# Patient Record
(demographics unavailable — no encounter records)

---

## 2024-10-28 NOTE — DATA REVIEWED
[FreeTextEntry1] : labs  10/24   mild anemia -DAVID with Fe 34 and onl 7% saturated....but nl B12, UA/ UPCR, heavy metal, Folate, CK, C3/ 4ESR, CRP, SPEP,  with recent comprehensive rheum studies still negative QFT, HLAB27, QFT, ANCA/ PR3/ MPO, RF/ CCP, Hep B- Core/C, ACE immunofixation  11/2022 nl ESR, CRP, neg ANCA, RF, ISAAC, QuantiFERON     Imaging:  CTScan 3/23 improved RUL nodule now 0.7 cm.  Small HH and s/p sleeve gastrectomy

## 2024-10-28 NOTE — PHYSICAL EXAM
[General Appearance - Alert] : alert [General Appearance - In No Acute Distress] : in no acute distress [Sclera] : the sclera and conjunctiva were normal [PERRL With Normal Accommodation] : pupils were equal in size, round, and reactive to light [Extraocular Movements] : extraocular movements were intact [Outer Ear] : the ears and nose were normal in appearance [Nasal Cavity] : the nasal mucosa and septum were normal [Oropharynx] : the oropharynx was normal [Neck Appearance] : the appearance of the neck was normal [Neck Cervical Mass (___cm)] : no neck mass was observed [Jugular Venous Distention Increased] : there was no jugular-venous distention [Thyroid Diffuse Enlargement] : the thyroid was not enlarged [Thyroid Nodule] : there were no palpable thyroid nodules [Respiration, Rhythm And Depth] : normal respiratory rhythm and effort [Exaggerated Use Of Accessory Muscles For Inspiration] : no accessory muscle use [Auscultation Breath Sounds / Voice Sounds] : lungs were clear to auscultation bilaterally [Heart Rate And Rhythm] : heart rate was normal and rhythm regular [Heart Sounds] : normal S1 and S2 [Heart Sounds Gallop] : no gallops [Murmurs] : no murmurs [Heart Sounds Pericardial Friction Rub] : no pericardial rub [Full Pulse] : the pedal pulses are present [Edema] : there was no peripheral edema [Bowel Sounds] : normal bowel sounds [Abdomen Soft] : soft [Abdomen Tenderness] : non-tender [Abdomen Mass (___ Cm)] : no abdominal mass palpated [Cervical Lymph Nodes Enlarged Posterior Bilaterally] : posterior cervical [Supraclavicular Lymph Nodes Enlarged Bilaterally] : supraclavicular [Cervical Lymph Nodes Enlarged Anterior Bilaterally] : anterior cervical [No CVA Tenderness] : no ~M costovertebral angle tenderness [No Spinal Tenderness] : no spinal tenderness [Skin Color & Pigmentation] : normal skin color and pigmentation [Skin Turgor] : normal skin turgor [] : no rash [Sensation] : the sensory exam was normal to light touch and pinprick [No Focal Deficits] : no focal deficits [Oriented To Time, Place, And Person] : oriented to person, place, and time [Impaired Insight] : insight and judgment were intact [Affect] : the affect was normal [Mood] : the mood was normal [Abnormal Walk] : normal gait [Nail Clubbing] : no clubbing  or cyanosis of the fingernails [Musculoskeletal - Swelling] : no joint swelling seen [Motor Tone] : muscle strength and tone were normal [FreeTextEntry1] : no overt synovitis- fROM throughout with mild diffuse tender points worse in lower extr

## 2024-10-28 NOTE — ASSESSMENT
[FreeTextEntry1] : 38 y/o f w/a PMHx of anxiety, depression, prediabetes, psoriasis, polyarthralgia, chronic GERD w/ HH, overweight (s/p gastric sleeve), and lumbar herniated disc with radiculopathy She is presenting in office for joint pain/weakness consultation.  Has completed full neuro w/u with little to explain widespread pain.  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 ex smoker   1) Diffuse polyarthralgias:  joint pain is primarily symmetrical in her hands, feet and hips and worse when she tries to use them and is limited by her pain ... hx CTS + NCS in past, now negative and continues w/ intermittent c/o paresthesias, weakness No previous or current overt swelling  but stiffness though < 20 mins   Routine use of 600-800 mg Advil with some benefit.  Able to care for self and family.. but not exercising "too much pain". Hx signficant for psoriasis x many ys, not active now + FH mother with RA on TNFi + response  Initial serologies 2022 were negative ISAAC/ RF and nl ESR, CRP  Associated with  - Fatigue:  chronically NRS light sleeper, wakes several times at HS rarely more than 5-6 hs / night  - Dry eyes controlled with use of contacts.. no gtts  - PNA last year with hemoptysis and again this year infection but no hemoptysis.. CTScan 3/23 improved RUL nodule now 0.7 cm.  Small HH and s/p sleeve gastrectomy - Mood:  long hx depression/ anxiety recently restarted Prozac 20 mg with + response but c/o routine night sweats on Prozac now and in past   Discussion:  hx suggestive possible early inflammatory arthritis given FH and psoriasis but little on exam today to suggest.  Also high risk for FM though likely meets personal criteria for such, only mild tender points on exam today.  Sleep/ and mood definitely need to be addressed. Discourage use of benzo for sleep.. trial of gabapentin highly recommended given # neuropathic c/o.  Updated serologies now and advised to call immediately if joint swelling presents in either symmetrical/ or asymmetrical pattern. Will also check HLAB27 and consider getting SI imaging if not noted in w/u to date. Need to review neuro w/u- imaging   2) Lung nodule:  RUL thought to be infectious, improved over time.. see above.    3) Overweight:  BMI 28 with hx of gastric sleeve procedure and HH.  Hx low testosterone?? related - DM A1c recently 6.4  - Low testosterone   4) Lumbar radiculopathy and paresthesias across thoracic spine (meralgia paraesthetica):  tolerable at this time  using topical agents...  has not tried gabapentin...    Plan  - Still needs formal exercise regimen-> balanced with caring for family etc..   - needs to come in for eval, unclear how much joint pain is  infflammatory vs. centralized   - eat healthy and limit inflammatory foods, monitor wt....reports she always feels worse when she gains weight   - what happened with gabapentin   - knows to call the office if symptoms worsen or experience a sudden onset of new pain, swelling, and stiffness

## 2024-10-28 NOTE — HISTORY OF PRESENT ILLNESS
[FreeTextEntry1] : Patient at home, has consented to telehealth video visit today  Provider:  Consuelo Humphrey [facility] [id number]  Verbal consent given on 10/28/2024 at 08:02 by KHRIS SUH, ~  ~. TEledoc   Last Seen nearly 1 year ago...  Has been back on Prozac and overall was doing better and felt that many of symptoms were due to stress and r/t mood issues.  Never did go for labs - ?? trial gabapentin?  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  Now back to diffuse complaints as noted last year..  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  Updated labs:10/24   mild anemia -DAVID with Fe 34 and onl 7% saturated....but nl B12, UA/ UPCR, heavy metal, Folate, CK, C3/ 4ESR, CRP, SPEP,  with recent comprehensive rheum studies still negative QFT, HLAB27, QFT, ANCA/ PR3/ MPO, RF/ CCP, Hep B- Core/C, ACE immunofixation  1) Diffuse polyarthralgias 2) Lung Nodule:  RUL 3) Overweight s/p gastric sleeve with DM  4) Lumbar / Thoracic radiculopathy- chronic nostalgia paresthetica...  _______________________________________________________________________ Initial HPI 2023  36 y/o f w/a PMHx of anxiety, depression, prediabetes, abnormal ANCA, psoriasis, polyarthralgia, chronic GERD, overweight, and lumbar herniated disc. She is presenting in office for joint pain/weakness consultation. Reports this is her last stop on her journey of figuring out the cause of her pain. She presented to neuro, spinal surgeon, OB, and PCP and she has been ruled out for any neuro deficit or damage.  - joint pain is primarily in her hands and worse when she tries to use them and is limited by her pain   - c/o weakness in her wrist and has difficulty holding things  - has numbness and stiffness in her hands as well when they are in a certain position.....difficulty driving, writing, etc. - in the past was dx w/ carpal tunnel  - also has some numbness in her feet   - has severe hip joint pain when walking and in feet attributes it to occurring after having her last born  - has a herniated disc in her back and attributes her back pain to this  - for her pain she takes Advil every day from 600-800 mg a day   - always feels fatigue and lethargic when she wakes up....also wakes up w/5-10 mins worth of stiffness in the morning  - sleep is not great, gets about 5-6 hrs of sleep - reports her sleep is very fragmented and she is not sure what is causing her to wake up   - does not exercise much because she is too much pain and has since gained weight   ROS - easy bruising - dry eyes....always had it not sure where it stems from, worse when she has on contacts....when severe she wears her glasses - active cough due to recent illness  - had pneumonia last year and was hospitalized for 4 days and had a lesion on her lung residual from it...reports they tracked the lesion and it has improved over time....last ct scan was over 6 months ago  - back pain reports residual from her herniated discs  - numbness/tingling in her hands and feet - eczema  - psoriasis.....hx on the inside of her elbows, neck and scalp describes it looking like plaque psoriasis, never got a biopsy, but reports that all her episodes resolved after high school - was on Prozac for a while d/c for 9 years and decided to get back on it 2 months ago after her mood started to take a turn for the worse w/ increase in home stressors...feels better since being back on it  - sweats like crazy on Prozac, but besides that she has not negative side effects  SURGICAL HX - gallbladder removal  - gastric bypass  SOCIAL HX -  w/ two kids (3 yr old and 17 months) M1 (elective) - former smoker of 15 years quit in ....does not drink alcohol  FAMILY HX - heart disease (father) - RA (mother)....been on enbrel for over 20 years, and her onset was when she was 37-38 years of age

## 2024-10-28 NOTE — REVIEW OF SYSTEMS
[Feeling Poorly] : feeling poorly [Feeling Tired] : feeling tired [Dry Eyes] : dryness of the eyes [Cough] : cough [Heartburn] : heartburn [Joint Pain] : joint pain [Joint Stiffness] : joint stiffness [As Noted in HPI] : as noted in HPI [Sleep Disturbances] : sleep disturbances [Anxiety] : anxiety [Depression] : depression [Negative] : Heme/Lymph [FreeTextEntry2] : stable wt- trying to exercise but at times limited 2/2 pain  [FreeTextEntry3] : denies inflammatory eye dz but + dry eyes  [FreeTextEntry6] : - RUL pneumonia last year and was hospitalized for 4 days and had a lesion on her lung residual from it...reports they tracked the lesion and it has improved over time....last ct scan was over 6 months ago  [FreeTextEntry7] : ? confirm hx of HH and  gastric sleeve [FreeTextEntry9] : most severely in her hands, but also has some back pain and hip pain- chronic x several ys  [de-identified] : has very dry skin- hx Psoriasis (scalp) and Eczema  [de-identified] : numbness/tingling in her hands and feet....also has some numbness in her back...she feels the pressure but can't describe whether it is sharp or dull pressure  [de-identified] : on prozac daily 20 mg + response and xanax PRN (rarely)  [de-identified] : prediabetic; has an appointment w/ endocrinologist soon to address low testosterone levels and pre-diabetes [de-identified] : easy bruising

## 2024-10-28 NOTE — REVIEW OF SYSTEMS
[Feeling Poorly] : feeling poorly [Feeling Tired] : feeling tired [Dry Eyes] : dryness of the eyes [Cough] : cough [Heartburn] : heartburn [Joint Pain] : joint pain [Joint Stiffness] : joint stiffness [As Noted in HPI] : as noted in HPI [Sleep Disturbances] : sleep disturbances [Anxiety] : anxiety [Depression] : depression [Negative] : Heme/Lymph [FreeTextEntry2] : stable wt- trying to exercise but at times limited 2/2 pain  [FreeTextEntry3] : denies inflammatory eye dz but + dry eyes  [FreeTextEntry6] : - RUL pneumonia last year and was hospitalized for 4 days and had a lesion on her lung residual from it...reports they tracked the lesion and it has improved over time....last ct scan was over 6 months ago  [FreeTextEntry7] : ? confirm hx of HH and  gastric sleeve [FreeTextEntry9] : most severely in her hands, but also has some back pain and hip pain- chronic x several ys  [de-identified] : has very dry skin- hx Psoriasis (scalp) and Eczema  [de-identified] : numbness/tingling in her hands and feet....also has some numbness in her back...she feels the pressure but can't describe whether it is sharp or dull pressure  [de-identified] : on prozac daily 20 mg + response and xanax PRN (rarely)  [de-identified] : prediabetic; has an appointment w/ endocrinologist soon to address low testosterone levels and pre-diabetes [de-identified] : easy bruising

## 2024-10-28 NOTE — HISTORY OF PRESENT ILLNESS
[FreeTextEntry1] : Patient at home, has consented to telehealth video visit today  Provider:  Consuelo Humphrey [facility] [id number]  Verbal consent given on 10/28/2024 at 08:02 by KHRIS SUH, ~  ~. TEledoc   Last Seen nearly 1 year ago...  Has been back on Prozac and overall was doing better and felt that many of symptoms were due to stress and r/t mood issues.  Never did go for labs - ?? trial gabapentin?  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  Now back to diffuse complaints as noted last year..  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  Updated labs:10/24   mild anemia -DAVID with Fe 34 and onl 7% saturated....but nl B12, UA/ UPCR, heavy metal, Folate, CK, C3/ 4ESR, CRP, SPEP,  with recent comprehensive rheum studies still negative QFT, HLAB27, QFT, ANCA/ PR3/ MPO, RF/ CCP, Hep B- Core/C, ACE immunofixation  1) Diffuse polyarthralgias 2) Lung Nodule:  RUL 3) Overweight s/p gastric sleeve with DM  4) Lumbar / Thoracic radiculopathy- chronic nostalgia paresthetica...  _______________________________________________________________________ Initial HPI 2023  38 y/o f w/a PMHx of anxiety, depression, prediabetes, abnormal ANCA, psoriasis, polyarthralgia, chronic GERD, overweight, and lumbar herniated disc. She is presenting in office for joint pain/weakness consultation. Reports this is her last stop on her journey of figuring out the cause of her pain. She presented to neuro, spinal surgeon, OB, and PCP and she has been ruled out for any neuro deficit or damage.  - joint pain is primarily in her hands and worse when she tries to use them and is limited by her pain   - c/o weakness in her wrist and has difficulty holding things  - has numbness and stiffness in her hands as well when they are in a certain position.....difficulty driving, writing, etc. - in the past was dx w/ carpal tunnel  - also has some numbness in her feet   - has severe hip joint pain when walking and in feet attributes it to occurring after having her last born  - has a herniated disc in her back and attributes her back pain to this  - for her pain she takes Advil every day from 600-800 mg a day   - always feels fatigue and lethargic when she wakes up....also wakes up w/5-10 mins worth of stiffness in the morning  - sleep is not great, gets about 5-6 hrs of sleep - reports her sleep is very fragmented and she is not sure what is causing her to wake up   - does not exercise much because she is too much pain and has since gained weight   ROS - easy bruising - dry eyes....always had it not sure where it stems from, worse when she has on contacts....when severe she wears her glasses - active cough due to recent illness  - had pneumonia last year and was hospitalized for 4 days and had a lesion on her lung residual from it...reports they tracked the lesion and it has improved over time....last ct scan was over 6 months ago  - back pain reports residual from her herniated discs  - numbness/tingling in her hands and feet - eczema  - psoriasis.....hx on the inside of her elbows, neck and scalp describes it looking like plaque psoriasis, never got a biopsy, but reports that all her episodes resolved after high school - was on Prozac for a while d/c for 9 years and decided to get back on it 2 months ago after her mood started to take a turn for the worse w/ increase in home stressors...feels better since being back on it  - sweats like crazy on Prozac, but besides that she has not negative side effects  SURGICAL HX - gallbladder removal  - gastric bypass  SOCIAL HX -  w/ two kids (3 yr old and 17 months) M1 (elective) - former smoker of 15 years quit in ....does not drink alcohol  FAMILY HX - heart disease (father) - RA (mother)....been on enbrel for over 20 years, and her onset was when she was 37-38 years of age   verbalization

## 2025-01-11 NOTE — REVIEW OF SYSTEMS
[Feeling Poorly] : feeling poorly [Feeling Tired] : feeling tired [Dry Eyes] : dryness of the eyes [Cough] : cough [Heartburn] : heartburn [Joint Pain] : joint pain [Joint Stiffness] : joint stiffness [As Noted in HPI] : as noted in HPI [Sleep Disturbances] : sleep disturbances [Anxiety] : anxiety [Depression] : depression [Negative] : Heme/Lymph [FreeTextEntry2] : 10lb wt gain since summer- trying to exercise but at times limited 2/2 pain  [FreeTextEntry3] : denies inflammatory eye dz but + dry eyes  [FreeTextEntry6] : -RUL lung nodule solid nodule 0.7 cm....last ct scan 3/23 decreased in size [FreeTextEntry7] : ? confirm hx of HH and  gastric sleeve (what was max wt) on prevacid  [FreeTextEntry9] : R3 PIP painful, red and swollen. most severely in her hands, but also has some back pain and hip pain- chronic x several ys  [de-identified] : has very dry skin- hx Psoriasis (scalp) and Eczema - nothing recently [de-identified] : numbness/tingling in her hands and feet....also has some numbness in her back...she feels the pressure but can't describe whether it is sharp or dull pressure  [de-identified] : 0.5mg xanax PRN. previously: on prozac daily 20 mg + response and  [de-identified] : prediabetic; has an appointment w/ endocrinologist to address low testosterone levels and pre-diabetes [de-identified] : easy bruising

## 2025-01-11 NOTE — DATA REVIEWED
[FreeTextEntry1] : labs  10/24   mild anemia -DAVID with Fe 34 and onl 7% saturated....but nl B12, UA/ UPCR, heavy metal, Folate, CK, C3/ 4ESR, CRP, SPEP,  with recent comprehensive rheum studies still negative QFT, HLAB27, QFT, ANCA/ PR3/ MPO, RF/ CCP, Hep B- Core/C, ACE immunofixation.  AVISE low + ISAAC  11/2022 nl ESR, CRP, neg ANCA, RF, ISAAC, QuantiFERON     Imaging:  CTScan 3/23 improved RUL nodule now 0.7 cm.  Small HH and s/p sleeve gastrectomy

## 2025-01-11 NOTE — HISTORY OF PRESENT ILLNESS
[FreeTextEntry1] : 1/10/25 URGENT visit... ongoing severe diffuse arthralgias.. progressively worse since stopping Prozac.   Most severe R4 PIP overt pain/ swelling redness x few days.. now nearly fully resolved, spontaneous onset/ resolution  - joints have been better when on Prozac but bothered by SE- severe diffuse hyperhidrosis and wt gain.  Working with psychiatrist, no alternate offered.  Admits anxiety not well controlled willing to reconsider something but ideally without SE   Joint pain at times so severe struggles to sleep, fine motor.. throbbing pain with use.  AMS at times up to 60 mins and struggles to raise arms- though able to do so, painful.   Also c/o n/t hands/ feet and increase in RLS worse with any rest/ and at HS   + response to low dose Xanax at HS 0.5 mg  Chronic fatigue:  Fe levels borderline low.... with Hb 10.5 and MCV 83 w/ nl B12/Folate but Fe 34 w/ only 7% staturation and elevated TIBC 489 Comprehensive rheum studies 10/24 essentially negative otherwise    Iron slightly low last lab - reports okay periods but constant fatigue Complains of "paper-thin nails" despite taking biotin every day   Patient at home, has consented to telehealth video visit today  Provider:  Consuelo Humphrey [facility] [id number]  Verbal consent given on 10/28/2024 at 08:02 by KHRIS SUH, ~  ~. TEledoc   Last Seen nearly 1 year ago...  Has been back on Prozac and overall was doing better and felt that many of symptoms were due to stress and r/t mood issues.  Never did go for labs - ?? trial gabapentin?  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  Now back to diffuse complaints as noted last year..  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  Updated labs:10/24   mild anemia -DAVID with Fe 34 and onl 7% saturated....but nl B12, UA/ UPCR, heavy metal, Folate, CK, C3/ 4ESR, CRP, SPEP,  with recent comprehensive rheum studies still negative QFT, HLAB27, QFT, ANCA/ PR3/ MPO, RF/ CCP, Hep B- Core/C, ACE immunofixation  1) Diffuse polyarthralgias 2) Lung Nodule:  RUL 3) Overweight s/p gastric sleeve with DM  4) Lumbar / Thoracic radiculopathy- chronic nostalgia paresthetica...  _______________________________________________________________________ Initial HPI 2023  38 y/o f w/a PMHx of anxiety, depression, prediabetes, abnormal ANCA, psoriasis, polyarthralgia, chronic GERD, overweight, and lumbar herniated disc. She is presenting in office for joint pain/weakness consultation. Reports this is her last stop on her journey of figuring out the cause of her pain. She presented to neuro, spinal surgeon, OB, and PCP and she has been ruled out for any neuro deficit or damage.  - joint pain is primarily in her hands and worse when she tries to use them and is limited by her pain   - c/o weakness in her wrist and has difficulty holding things  - has numbness and stiffness in her hands as well when they are in a certain position.....difficulty driving, writing, etc. - in the past was dx w/ carpal tunnel  - also has some numbness in her feet   - has severe hip joint pain when walking and in feet attributes it to occurring after having her last born  - has a herniated disc in her back and attributes her back pain to this  - for her pain she takes Advil every day from 600-800 mg a day   - always feels fatigue and lethargic when she wakes up....also wakes up w/5-10 mins worth of stiffness in the morning  - sleep is not great, gets about 5-6 hrs of sleep - reports her sleep is very fragmented and she is not sure what is causing her to wake up   - does not exercise much because she is too much pain and has since gained weight   ROS - easy bruising - dry eyes....always had it not sure where it stems from, worse when she has on contacts....when severe she wears her glasses - active cough due to recent illness  - had pneumonia last year and was hospitalized for 4 days and had a lesion on her lung residual from it...reports they tracked the lesion and it has improved over time....last ct scan was over 6 months ago  - back pain reports residual from her herniated discs  - numbness/tingling in her hands and feet - eczema  - psoriasis.....hx on the inside of her elbows, neck and scalp describes it looking like plaque psoriasis, never got a biopsy, but reports that all her episodes resolved after high school - was on Prozac for a while d/c for 9 years and decided to get back on it 2 months ago after her mood started to take a turn for the worse w/ increase in home stressors...feels better since being back on it  - sweats like crazy on Prozac, but besides that she has not negative side effects  SURGICAL HX - gallbladder removal  - gastric bypass  SOCIAL HX -  w/ two kids (3 yr old and 17 months) M1 (elective) - former smoker of 15 years quit in ....does not drink alcohol  FAMILY HX - heart disease (father) - RA (mother)....been on enbrel for over 20 years, and her onset was when she was 37-38 years of age

## 2025-01-11 NOTE — REVIEW OF SYSTEMS
[Feeling Poorly] : feeling poorly [Feeling Tired] : feeling tired [Dry Eyes] : dryness of the eyes [Cough] : cough [Heartburn] : heartburn [Joint Pain] : joint pain [Joint Stiffness] : joint stiffness [As Noted in HPI] : as noted in HPI [Sleep Disturbances] : sleep disturbances [Anxiety] : anxiety [Depression] : depression [Negative] : Heme/Lymph [FreeTextEntry2] : 10lb wt gain since summer- trying to exercise but at times limited 2/2 pain  [FreeTextEntry3] : denies inflammatory eye dz but + dry eyes  [FreeTextEntry6] : -RUL lung nodule solid nodule 0.7 cm....last ct scan 3/23 decreased in size [FreeTextEntry7] : ? confirm hx of HH and  gastric sleeve (what was max wt) on prevacid  [FreeTextEntry9] : R3 PIP painful, red and swollen. most severely in her hands, but also has some back pain and hip pain- chronic x several ys  [de-identified] : has very dry skin- hx Psoriasis (scalp) and Eczema - nothing recently [de-identified] : numbness/tingling in her hands and feet....also has some numbness in her back...she feels the pressure but can't describe whether it is sharp or dull pressure  [de-identified] : 0.5mg xanax PRN. previously: on prozac daily 20 mg + response and  [de-identified] : prediabetic; has an appointment w/ endocrinologist to address low testosterone levels and pre-diabetes [de-identified] : easy bruising

## 2025-01-11 NOTE — ASSESSMENT
[FreeTextEntry1] : 39 y/o f w/a PMHx of anxiety, depression, prediabetes, psoriasis, polyarthralgia, chronic GERD w/ HH, overweight (s/p gastric sleeve), and lumbar herniated disc with radiculopathy She is presenting in office for joint pain/weakness consultation.  Has completed full neuro w/u with little to explain widespread pain.  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 ex smoker   1) Psoriasis:  HLAB27 neg, SI joints (pending).. w/ long hx of diffuse arthralgias, possible dactylitis R4 PIP and intermittent plantar fascitis, GTB TTP.. and ?? trigger finger in past    Psoriasis- scalp and noted on elbows in past, not active now Denies IED, IBD sx.. and minimal c/o Inflammatory back pain- "always" some degree of back pain with nearly fROM on exam.  Associated with dry eyes:  denies IED, but mild. Needs opth eval r/o inflammatory changes  NOTE:  - Hx significant for psoriasis x many ys, not active now + FH mother with RA on TNFi + response  - Initial serologies 2022 were negative ISAAC/ RF and nl ESR, CRP-> repeat 10/24 still negative  - Recurrent bouts of PNA last year with hemoptysis and again this year infection but no hemoptysis.. CTScan 3/23 improved RUL nodule now 0.7 cm.    Discussion:  hx suggestive possible early inflammatory arthritis given FH and psoriasis but little on exam today to suggest.  Also high risk for FM though likely meets personal criteria for such, only mild tender points on exam today.  Sleep/ and mood definitely need to be addressed. Discourage use of benzo for sleep.. trial of gabapentin highly recommended given # neuropathic c/o.  Updated serologies now and advised to call immediately if joint swelling presents in either symmetrical/ or asymmetrical pattern. Will also check HLAB27 and consider getting SI imaging if not noted in w/u to date. Need to review neuro w/u- imaging   2) Depression/ Anxiety with somatic features:  Diffuse polyarthralgias/ myalgias- marked improvement with effective management of mood- off SSRI increase in joint pain repeatedly.  Not on anything now - admits mood not fully controlled but willing to consider tx. Will d/w psychiatrist newer SSRI .. for now continues PRN alprazolam (from psych).. but encourage use of gabapentin to manage RLS and insomnia instead.  Functionally:  able to work/ care for self but not exercising.. needs to resume - RLS/ Paresthesias:  marked, in setting of known DAVID will also address this with Fe supplement QOD and start low dose gabapentin (instructions provided for titration).  + hx CTS (see below)...  - Insomnia:  NRS rarely more than 5-6 hs and interrupted... as noted above, should minimize use of benzo for sleep- not restorative sleep.  :  joint pain is primarily symmetrical in her hands, feet and hips and worse when she tries to use them and is limited by her pain . NOTE:  - Prozac:  wt gain and hyperhidrosis and night sweats, but effective and pain markedly better...   3) Neuropathy: Multiple issues, to include RLS (as noted above)... not well controlled at this time  CTS:  BL confirmed on EMG/ NCS.. tolerable at this time.  Lumbar radiculopathy:  not active now  Meralgia paraesthetica:  long standing thoracic localized back pain.. topical lido + response)  has not tried gabapentin...   4) Obesity:  BMI now 29 with wt of 170 (? max)-  (lowest wt 158)  s/p gastric sleeve now with HH - DM A1c recently 6.4  - Low testosterone   5) Lung nodule:  RUL thought to be infectious, improved over time.. see above.    3) Overweight:  BMI 28 with hx of gastric sleeve procedure and HH.  Hx low testosterone?? related  Plan 1/10/25: - Discuss alternative to Prozac with psych (potentially 5-10mg Lexapro)  - Given iron supplement to be taken every other day for RLS and   - Start 300mg Gabapentin before bed, only use Xanax if necessary.   Start with 1 tab at bedtime and increase as needed and tolerated for improved pain and sleep at night.  Once tolerated at HS may try lower doses in daytime if needed q 8 hours- again at lowest effective dose.   Symptoms to watch for: dyscognition, fatigue/ grogginess, balance, blurred vision.. if any of this occurs you can't continue to increase dose.  - Contact us if joint inflammation immediately/ or increase in psoriasis.. given nurse's line - f/u lung nodule, has this been reevaluated  - Follow-up (in person) in 3mo - new labs to be agarwal beforehand  - Continue to focus on healthy diet and limit inflammatory foods, monitor wt....reports she always feels worse when she gains weight ... progressive increase noted lately... ?? taking phenteramine?

## 2025-01-11 NOTE — PHYSICAL EXAM
[General Appearance - Alert] : alert [General Appearance - In No Acute Distress] : in no acute distress [Sclera] : the sclera and conjunctiva were normal [PERRL With Normal Accommodation] : pupils were equal in size, round, and reactive to light [Extraocular Movements] : extraocular movements were intact [Outer Ear] : the ears and nose were normal in appearance [Nasal Cavity] : the nasal mucosa and septum were normal [Oropharynx] : the oropharynx was normal [Respiration, Rhythm And Depth] : normal respiratory rhythm and effort [No CVA Tenderness] : no ~M costovertebral angle tenderness [No Spinal Tenderness] : no spinal tenderness [Skin Color & Pigmentation] : normal skin color and pigmentation [Skin Turgor] : normal skin turgor [Sensation] : the sensory exam was normal to light touch and pinprick [No Focal Deficits] : no focal deficits [Oriented To Time, Place, And Person] : oriented to person, place, and time [Impaired Insight] : insight and judgment were intact [Affect] : the affect was normal [Mood] : the mood was normal [Abnormal Walk] : normal gait [Nail Clubbing] : no clubbing  or cyanosis of the fingernails [Musculoskeletal - Swelling] : no joint swelling seen [Motor Tone] : muscle strength and tone were normal [Heart Rate And Rhythm] : heart rate was normal and rhythm regular [Heart Sounds] : normal S1 and S2 [Heart Sounds Gallop] : no gallops [Murmurs] : no murmurs [Heart Sounds Pericardial Friction Rub] : no pericardial rub [Full Pulse] : the pedal pulses are present [Edema] : there was no peripheral edema [Bowel Sounds] : normal bowel sounds [Abdomen Soft] : soft [Abdomen Tenderness] : non-tender [] : no hepato-splenomegaly [Abdomen Mass (___ Cm)] : no abdominal mass palpated [Cervical Lymph Nodes Enlarged Posterior Bilaterally] : posterior cervical [Cervical Lymph Nodes Enlarged Anterior Bilaterally] : anterior cervical [Supraclavicular Lymph Nodes Enlarged Bilaterally] : supraclavicular [Exaggerated Use Of Accessory Muscles For Inspiration] : no accessory muscle use [Auscultation Breath Sounds / Voice Sounds] : lungs were clear to auscultation bilaterally [FreeTextEntry1] : R3 PIP still sl boggy and TTP;  . Bilateral GTB TTP. noother overt synovitis- fROM throughout with mild diffuse tender points worse in lower extr.

## 2025-03-03 NOTE — HISTORY OF PRESENT ILLNESS
[FreeTextEntry1] : I have had pain in my left side for about 2 years now - it is getting worse [de-identified] : This 39 yo f presents with c/o LLQ pain and bilateral breast pain that she describes as being "worse than when I was pregnant". She informs she only drinks 2 cups of coffee daily. She has hx. of GERD. She is s/p gastric sleeve in 2018. The LLQ pain started a few years after her sleeve surgery. She informs that her gyn has done an ultrasound internally and it only revealed a tiny fibroid. Denies NVD. She has had 2 colonoscopies thus far for this same c/o LLQ pain.  Colonoscopy revealed polyps for which she now has to be followed every 3 years for repeat colonoscopies.  She informs colonoscopies resulted in a formal diagnosis of IBS but was given no medication. She admits to eating processed foods, drinking diet soda and adding a lot of sugary cream to her coffees. She drinks "no water". She further informs she is stressed a lot of the time because she has a 2 and 4 year old. Was on Phentermine - DC'd because "it wasn't doing anything"

## 2025-03-03 NOTE — PLAN
[FreeTextEntry1] : Had extensive conversation with pt. about foods to avoid with IBS Pt. counseled on drinking at least 64 oz of water daily and decreasing her caffeine intake Will return in March for CPE Pt aware of glucosuria and advised to limit carb and sugar intake

## 2025-03-03 NOTE — PHYSICAL EXAM
[No Acute Distress] : no acute distress [Well Nourished] : well nourished [Well Developed] : well developed [Well-Appearing] : well-appearing [No Respiratory Distress] : no respiratory distress  [Clear to Auscultation] : lungs were clear to auscultation bilaterally [Normal Rate] : normal rate  [Regular Rhythm] : with a regular rhythm [Normal Affect] : the affect was normal [Alert and Oriented x3] : oriented to person, place, and time [Normal Mood] : the mood was normal [Normal Insight/Judgement] : insight and judgment were intact [Normal] : affect was normal and insight and judgment were intact [Soft] : abdomen soft [Non-distended] : non-distended [Normal Bowel Sounds] : normal bowel sounds [de-identified] : Informs of breast tenderness bilaterally - UCG negative [de-identified] : Palpation of abdomen revelaed no tenderness except to deep palpation of the LLQ. No hepatomegaly or suprapubic area tenderness. [de-identified] : UA negative for UTI but 100mg/dl glucose noted - last A1C 5.7 March 2024

## 2025-03-03 NOTE — PHYSICAL EXAM
[No Acute Distress] : no acute distress [Well Nourished] : well nourished [Well Developed] : well developed [Well-Appearing] : well-appearing [No Respiratory Distress] : no respiratory distress  [Clear to Auscultation] : lungs were clear to auscultation bilaterally [Normal Rate] : normal rate  [Regular Rhythm] : with a regular rhythm [Normal Affect] : the affect was normal [Alert and Oriented x3] : oriented to person, place, and time [Normal Mood] : the mood was normal [Normal Insight/Judgement] : insight and judgment were intact [Normal] : affect was normal and insight and judgment were intact [Soft] : abdomen soft [Non-distended] : non-distended [Normal Bowel Sounds] : normal bowel sounds [de-identified] : Informs of breast tenderness bilaterally - UCG negative [de-identified] : Palpation of abdomen revelaed no tenderness except to deep palpation of the LLQ. No hepatomegaly or suprapubic area tenderness. [de-identified] : UA negative for UTI but 100mg/dl glucose noted - last A1C 5.7 March 2024

## 2025-03-03 NOTE — HISTORY OF PRESENT ILLNESS
[FreeTextEntry1] : I have had pain in my left side for about 2 years now - it is getting worse [de-identified] : This 39 yo f presents with c/o LLQ pain and bilateral breast pain that she describes as being "worse than when I was pregnant". She informs she only drinks 2 cups of coffee daily. She has hx. of GERD. She is s/p gastric sleeve in 2018. The LLQ pain started a few years after her sleeve surgery. She informs that her gyn has done an ultrasound internally and it only revealed a tiny fibroid. Denies NVD. She has had 2 colonoscopies thus far for this same c/o LLQ pain.  Colonoscopy revealed polyps for which she now has to be followed every 3 years for repeat colonoscopies.  She informs colonoscopies resulted in a formal diagnosis of IBS but was given no medication. She admits to eating processed foods, drinking diet soda and adding a lot of sugary cream to her coffees. She drinks "no water". She further informs she is stressed a lot of the time because she has a 2 and 4 year old. Was on Phentermine - DC'd because "it wasn't doing anything"

## 2025-03-17 NOTE — HISTORY OF PRESENT ILLNESS
[de-identified] : 38 year old F with PMH pre-DM, anxiety/depression, and polyarthralgia presents for weight loss management. Pt denies CP/SOB, fever/chills, n/v/d/c.

## 2025-05-06 NOTE — PLAN
[FreeTextEntry1] : Routine blood work drawn in office and urine collected today. Start Topiramate 25mg. Refill meds. Will run tests ordered by specialist. I-STOP checked. Pt advised to sign up for [facility] portal to review labs and communicate any questions or concerns directly. Yearly physical and return as needed for illness, medication refills, and new or existing complaints.

## 2025-05-06 NOTE — HEALTH RISK ASSESSMENT
[Good] : ~his/her~  mood as  good [Yes] : Yes [2 - 4 times a month (2 pts)] : 2-4 times a month (2 points) [1 or 2 (0 pts)] : 1 or 2 (0 points) [Never (0 pts)] : Never (0 points) [No] : In the past 12 months have you used drugs other than those required for medical reasons? No [0] : 2) Feeling down, depressed, or hopeless: Not at all (0) [PHQ-2 Negative - No further assessment needed] : PHQ-2 Negative - No further assessment needed [Audit-CScore] : 2 [[id number]] : 0 [Never] : Never [Patient reported PAP Smear was normal] : Patient reported PAP Smear was normal [None] : None [With Family] : lives with family [Employed] : employed [] :  [# Of Children ___] : has [unfilled] children

## 2025-05-06 NOTE — HISTORY OF PRESENT ILLNESS
[de-identified] : 39 year old F with PMH pre-DM, anxiety/depression, and polyarthralgia presents for CPE. Pt denies CP/SOB, fever/chills, n/v/d/c.